# Patient Record
Sex: MALE | Race: AMERICAN INDIAN OR ALASKA NATIVE | NOT HISPANIC OR LATINO | Employment: UNEMPLOYED | URBAN - METROPOLITAN AREA
[De-identification: names, ages, dates, MRNs, and addresses within clinical notes are randomized per-mention and may not be internally consistent; named-entity substitution may affect disease eponyms.]

---

## 2017-09-02 ENCOUNTER — OFFICE VISIT (OUTPATIENT)
Dept: URGENT CARE | Facility: CLINIC | Age: 37
End: 2017-09-02
Payer: COMMERCIAL

## 2017-09-02 VITALS
DIASTOLIC BLOOD PRESSURE: 92 MMHG | HEIGHT: 71 IN | SYSTOLIC BLOOD PRESSURE: 132 MMHG | OXYGEN SATURATION: 97 % | WEIGHT: 205 LBS | RESPIRATION RATE: 16 BRPM | BODY MASS INDEX: 28.7 KG/M2 | HEART RATE: 98 BPM | TEMPERATURE: 98.6 F

## 2017-09-02 DIAGNOSIS — S39.012A LUMBOSACRAL STRAIN, INITIAL ENCOUNTER: Primary | ICD-10-CM

## 2017-09-02 PROCEDURE — 99204 OFFICE O/P NEW MOD 45 MIN: CPT | Performed by: PHYSICIAN ASSISTANT

## 2017-09-02 RX ORDER — HYDROCODONE BITARTRATE AND ACETAMINOPHEN 5; 325 MG/1; MG/1
1-2 TABLET ORAL EVERY 4 HOURS PRN
Qty: 20 TAB | Refills: 0 | Status: SHIPPED | OUTPATIENT
Start: 2017-09-02 | End: 2017-09-05

## 2017-09-02 RX ORDER — CYCLOBENZAPRINE HCL 5 MG
5-10 TABLET ORAL 3 TIMES DAILY PRN
Qty: 30 TAB | Refills: 0 | Status: SHIPPED | OUTPATIENT
Start: 2017-09-02

## 2017-09-02 RX ORDER — CYCLOBENZAPRINE HCL 5 MG
5-10 TABLET ORAL 3 TIMES DAILY PRN
Qty: 30 TAB | Refills: 0 | Status: SHIPPED | OUTPATIENT
Start: 2017-09-02 | End: 2017-09-02 | Stop reason: SDUPTHER

## 2017-09-02 RX ORDER — METHYLPREDNISOLONE 4 MG/1
TABLET ORAL
Qty: 21 TAB | Refills: 0 | Status: SHIPPED | OUTPATIENT
Start: 2017-09-02 | End: 2017-09-02 | Stop reason: SDUPTHER

## 2017-09-02 RX ORDER — METHYLPREDNISOLONE 4 MG/1
TABLET ORAL
Qty: 21 TAB | Refills: 0 | Status: SHIPPED | OUTPATIENT
Start: 2017-09-02 | End: 2019-01-24

## 2017-09-02 NOTE — PROGRESS NOTES
Subjective:      Pt is a 37 y.o. male who presents with Back Pain (while assisting mother from bed to bathroom )            HPI  Pt states he was caring for his injured mother moving her from the bathroom to her bed and injured his lower back while laying her on her bed x2 days. Pt has not taken any Rx medications for this condition. Pt states the pain is a 7-8/10, aching in nature and worse at night. Pt denies CP, SOB, NVD, paresthesias, headaches, dizziness, change in vision, hives, or other joint pain. Pt denies any bowel or bladder incontinence with this issue.  The pt's medication list, problem list, and allergies have been evaluated and reviewed during today's visit.    PMH:  Past Medical History:   Diagnosis Date   • Heart burn    • HIV (human immunodeficiency virus infection) (CMS-HCC)    • Hypercholesteremia        PSH:  Past Surgical History:   Procedure Laterality Date   • KNEE ARTHROSCOPY  10/1/2009    Performed by SHARON MURRELL at SURGERY [facility] ORS   • MEDIAL MENISCECTOMY  10/1/2009    Performed by SHARON MURRELL at SURGERY [facility] ORS   • DENTAL EXTRACTION(S)  2009    widsom teeth x 2       Fam Hx:  the patient's family history is not pertinent to their current complaint    Family Status   Relation Status   •     •     •         Soc HX:  Social History     Social History   • Marital status: Single     Spouse name: N/A   • Number of children: N/A   • Years of education: N/A     Occupational History   • Not on file.     Social History Main Topics   • Smoking status: Never Smoker   • Smokeless tobacco: Never Used   • Alcohol use No   • Drug use: No   • Sexual activity: Not Currently     Other Topics Concern   • Not on file     Social History Narrative   • No narrative on file         Medications:    Current Outpatient Prescriptions:   •  hydrocodone-acetaminophen (NORCO) 5-325 MG Tab per tablet, Take 1-2 Tabs by mouth every four hours as needed for up to 3 days., Disp: 20 Tab, Rfl: 0  •   "MethylPREDNISolone (MEDROL DOSEPAK) 4 MG Tablet Therapy Pack, Use as directed, Disp: 21 Tab, Rfl: 0  •  cyclobenzaprine (FLEXERIL) 5 MG tablet, Take 1-2 Tabs by mouth 3 times a day as needed., Disp: 30 Tab, Rfl: 0  •  efavirenz (SUSTIVA) 200 MG Cap, Take 600 mg by mouth every evening., Disp: , Rfl:   •  abacavir-lamivudine (EPZICOM) 600-300 MG per tablet, Take 1 Tab by mouth every day., Disp: , Rfl:       Allergies:  Review of patient's allergies indicates no known allergies.    ROS  Constitutional: Negative for fever, chills and malaise/fatigue.   HENT: Negative for congestion and sore throat.    Eyes: Negative for blurred vision, double vision and photophobia.   Respiratory: Negative for cough and shortness of breath.    Cardiovascular: Negative for chest pain and palpitations.   Gastrointestinal: Negative for heartburn, nausea, vomiting, abdominal pain, diarrhea and constipation.   Genitourinary: Negative for dysuria and flank pain.   Musculoskeletal: POS for lumbar joint pain and myalgias.   Skin: Negative for itching and rash.   Neurological: Negative for dizziness, tingling and headaches.   Endo/Heme/Allergies: Does not bruise/bleed easily.   Psychiatric/Behavioral: Negative for depression. The patient is not nervous/anxious.             Objective:     /92   Pulse 98   Temp 37 °C (98.6 °F)   Resp 16   Ht 1.803 m (5' 11\")   Wt 93 kg (205 lb)   SpO2 97%   BMI 28.59 kg/m²      Physical Exam      Constitutional: PT is oriented to person, place, and time. PT appears well-developed and well-nourished. No distress.   HENT:   Head: Normocephalic and atraumatic.   Mouth/Throat: Oropharynx is clear and moist. No oropharyngeal exudate.   Eyes: Conjunctivae normal and EOM are normal. Pupils are equal, round, and reactive to light.   Neck: Normal range of motion. Neck supple. No thyromegaly present.   Cardiovascular: Normal rate, regular rhythm, normal heart sounds and intact distal pulses.  Exam reveals no " gallop and no friction rub.    No murmur heard.  Pulmonary/Chest: Effort normal and breath sounds normal. No respiratory distress. PT has no wheezes. PT has no rales. Pt exhibits no tenderness.   Abdominal: Soft. Bowel sounds are normal. PT exhibits no distension and no mass. There is no tenderness. There is no rebound and no guarding.   Musculoskeletal: Upon inspection, no ecchymoses, no edema, no erythema. +TTP about paraspinal muscles, BLE: +AROM, +SILT, STR 5/5, DP 2+ B/L   Neurological: PT is alert and oriented to person, place, and time. PT has normal reflexes. No cranial nerve deficit.   Skin: Skin is warm and dry. No rash noted. PT is not diaphoretic. No erythema.       Psychiatric: PT has a normal mood and affect. PT behavior is normal. Judgment and thought content normal.          Assessment/Plan:     1. Lumbosacral strain, initial encounter    - hydrocodone-acetaminophen (NORCO) 5-325 MG Tab per tablet; Take 1-2 Tabs by mouth every four hours as needed for up to 3 days.  Dispense: 20 Tab; Refill: 0  - MethylPREDNISolone (MEDROL DOSEPAK) 4 MG Tablet Therapy Pack; Use as directed  Dispense: 21 Tab; Refill: 0  - cyclobenzaprine (FLEXERIL) 5 MG tablet; Take 1-2 Tabs by mouth 3 times a day as needed.  Dispense: 30 Tab; Refill: 0    Work note for light duty given to pt  [place]  Aware web site evaluation: I have obtained and reviewed patient utilization report from [facility] pharmacy database prior to writing prescription for controlled substance II, III or IV per [place] bill . Based on the report and my clinical assessment the prescription is medically necessary.   NSAIDs for pain 1-5, Norco for pain 6-10 or to help get to sleep.  Pt to take FMLA paperwork to PCP as it is not [facility] policy for UC to complete  RICE therapy discussed  Gentle ROM exercises discussed  WBAT BLE  Ice/heat therapy discussed  Rest, fluids encouraged.  AVS with medical info given.  Pt was in full understanding and agreement  with the plan.  Follow-up as needed if symptoms worsen or fail to improve.

## 2017-09-02 NOTE — PATIENT INSTRUCTIONS
Lumbosacral Strain  Lumbosacral strain is a strain of any of the parts that make up your lumbosacral vertebrae. Your lumbosacral vertebrae are the bones that make up the lower third of your backbone. Your lumbosacral vertebrae are held together by muscles and tough, fibrous tissue (ligaments).   CAUSES   A sudden blow to your back can cause lumbosacral strain. Also, anything that causes an excessive stretch of the muscles in the low back can cause this strain. This is typically seen when people exert themselves strenuously, fall, lift heavy objects, bend, or crouch repeatedly.  RISK FACTORS  · Physically demanding work.  · Participation in pushing or pulling sports or sports that require a sudden twist of the back (tennis, golf, baseball).  · Weight lifting.  · Excessive lower back curvature.  · Forward-tilted pelvis.  · Weak back or abdominal muscles or both.  · Tight hamstrings.  SIGNS AND SYMPTOMS   Lumbosacral strain may cause pain in the area of your injury or pain that moves (radiates) down your leg.   DIAGNOSIS  Your health care provider can often diagnose lumbosacral strain through a physical exam. In some cases, you may need tests such as X-ray exams.   TREATMENT   Treatment for your lower back injury depends on many factors that your clinician will have to evaluate. However, most treatment will include the use of anti-inflammatory medicines.  HOME CARE INSTRUCTIONS   · Avoid hard physical activities (tennis, racquetball, waterskiing) if you are not in proper physical condition for it. This may aggravate or create problems.  · If you have a back problem, avoid sports requiring sudden body movements. Swimming and walking are generally safer activities.  · Maintain good posture.  · Maintain a healthy weight.  · For acute conditions, you may put ice on the injured area.  ¨ Put ice in a plastic bag.  ¨ Place a towel between your skin and the bag.  ¨ Leave the ice on for 20 minutes, 2-3 times a day.  · When the  low back starts healing, stretching and strengthening exercises may be recommended.  SEEK MEDICAL CARE IF:  · Your back pain is getting worse.  · You experience severe back pain not relieved with medicines.  SEEK IMMEDIATE MEDICAL CARE IF:   · You have numbness, tingling, weakness, or problems with the use of your arms or legs.  · There is a change in bowel or bladder control.  · You have increasing pain in any area of the body, including your belly (abdomen).  · You notice shortness of breath, dizziness, or feel faint.  · You feel sick to your stomach (nauseous), are throwing up (vomiting), or become sweaty.  · You notice discoloration of your toes or legs, or your feet get very cold.  MAKE SURE YOU:   · Understand these instructions.  · Will watch your condition.  · Will get help right away if you are not doing well or get worse.     This information is not intended to replace advice given to you by your health care provider. Make sure you discuss any questions you have with your health care provider.     Document Released: 09/27/2006 Document Revised: 01/08/2016 Document Reviewed: 08/06/2014  [employer] Patient Education ©2016 [employer]

## 2017-09-02 NOTE — LETTER
September 2, 2017       Patient: Tres Sandoval   YOB: 1980   Date of Visit: 9/2/2017         To Whom It May Concern:    It is my medical opinion that Tres Sandoval may be excused from work for the dates of 9/3/17-9/7/17. 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 Pt's primary care to fill out FMLA paperwork as we do not at the urgent care level.       If you have any questions or concerns, please don't hesitate to call [phone]          Sincerely,          Aakash Michael P.A.-C.  Electronically Signed

## 2019-01-24 ENCOUNTER — HOSPITAL ENCOUNTER (EMERGENCY)
Facility: MEDICAL CENTER | Age: 39
End: 2019-01-24
Payer: COMMERCIAL

## 2019-01-24 VITALS
TEMPERATURE: 97.5 F | BODY MASS INDEX: 30.74 KG/M2 | HEIGHT: 71 IN | WEIGHT: 219.58 LBS | OXYGEN SATURATION: 94 % | RESPIRATION RATE: 16 BRPM | DIASTOLIC BLOOD PRESSURE: 92 MMHG | HEART RATE: 105 BPM | SYSTOLIC BLOOD PRESSURE: 138 MMHG

## 2019-01-24 PROCEDURE — 302449 STATCHG TRIAGE ONLY (STATISTIC)

## 2019-01-24 RX ORDER — IBUPROFEN 200 MG
800 TABLET ORAL EVERY 6 HOURS PRN
COMMUNITY

## 2019-01-24 NOTE — ED TRIAGE NOTES
Pt ambulated to triage with   Chief Complaint   Patient presents with   • Flu Like Symptoms     fever chills cough for the last 3 wks,    • Side Pain     right side, pt reportrs that he thought it was a broken rib, reports sharp shooting pain that started last night;    • Diarrhea     no blood in stool, last episode yesterday.     Pt has h/o HIV.  Pt Informed regarding triage process and verbalized understanding to inform triage tech or RN for any changes in condition. Placed in lobby.

## 2019-01-25 NOTE — ED NOTES
"Pt mother walked up and asked several times \"When is my son going to get a room we have been here for 3 hrs\". Pt mother was advised that PT has been here for 1 hr and 38 mins and there is 3 ppl in front of him at this time. Pt stated  Again \" when is he going to get a room because I  Have seen several ppl here that came after us that have room and we don't \". Pt was advised of triage process several times. Notified charge nurse of Pt  Mother  Compliant.  "

## 2019-01-25 NOTE — ED NOTES
"While trying to draw labs patients mother was upset, \"this is taking too damn long.\" apologized to patient and his mother, explained drawing labs would expedite his care.  Patient stood up and stated he was leaving and going to [facility].  Left before further care rendered.    "

## 2022-12-23 ENCOUNTER — HOSPITAL ENCOUNTER (EMERGENCY)
Facility: MEDICAL CENTER | Age: 42
End: 2022-12-24
Attending: EMERGENCY MEDICINE
Payer: COMMERCIAL

## 2022-12-23 VITALS
TEMPERATURE: 98.2 F | WEIGHT: 227.51 LBS | BODY MASS INDEX: 31.85 KG/M2 | OXYGEN SATURATION: 96 % | DIASTOLIC BLOOD PRESSURE: 84 MMHG | HEIGHT: 71 IN | HEART RATE: 101 BPM | SYSTOLIC BLOOD PRESSURE: 135 MMHG | RESPIRATION RATE: 17 BRPM

## 2022-12-23 DIAGNOSIS — K04.7 DENTAL INFECTION: ICD-10-CM

## 2022-12-23 DIAGNOSIS — K04.7 PERIAPICAL ABSCESS: ICD-10-CM

## 2022-12-23 PROCEDURE — 99283 EMERGENCY DEPT VISIT LOW MDM: CPT

## 2022-12-23 PROCEDURE — 41800 DRAINAGE OF GUM LESION: CPT

## 2022-12-23 PROCEDURE — 42999 UNLISTED PX PHRNX ADND/TNSL: CPT

## 2022-12-23 RX ORDER — AMOXICILLIN 500 MG/1
500 CAPSULE ORAL 3 TIMES DAILY
Qty: 21 CAPSULE | Refills: 0 | Status: SHIPPED | OUTPATIENT
Start: 2022-12-23 | End: 2022-12-30

## 2022-12-23 RX ORDER — AMOXICILLIN 500 MG/1
500 CAPSULE ORAL ONCE
Status: COMPLETED | OUTPATIENT
Start: 2022-12-24 | End: 2022-12-24

## 2022-12-24 PROCEDURE — 700102 HCHG RX REV CODE 250 W/ 637 OVERRIDE(OP): Performed by: EMERGENCY MEDICINE

## 2022-12-24 PROCEDURE — A9270 NON-COVERED ITEM OR SERVICE: HCPCS | Performed by: EMERGENCY MEDICINE

## 2022-12-24 PROCEDURE — 41800 DRAINAGE OF GUM LESION: CPT

## 2022-12-24 RX ADMIN — AMOXICILLIN 500 MG: 500 CAPSULE ORAL at 00:11

## 2022-12-24 NOTE — ED PROVIDER NOTES
ED Provider Note    Scribed for Tarun Dawson M.D. by Alec Chiang. 12/23/2022,  11:18 PM.    Means of Arrival: Walk-In  History obtained from: Patient   History limited by: None     CHIEF COMPLAINT  Chief Complaint   Patient presents with    Dental Pain     Patient reports had a filling fall out last week. Patient reports right upper tooth now has pain and swelling x 2 days.     HPI  Tres Sandoval is a 42 y.o. male who presents to the Emergency Department for upper right sided dental pain onset 2 days ago. Patient reports associated right sided facial swelling, headache. Patient has attempted to alleviate his symptoms with OTC pain medication. There are no exacerbating factors. He denies associated fever.     REVIEW OF SYSTEMS  CONSTITUTIONAL:  Negative for fever.  HEENT: Positive for right upper sided dental pain, right sided facial swelling.   NEUROLOGIC:  Positive for headache.   See HPI for further details.     PAST MEDICAL HISTORY  Past Medical History:   Diagnosis Date    Heart burn     HIV (human immunodeficiency virus infection) (HCC)     Hypercholesteremia        FAMILY HISTORY  Family History   Problem Relation Age of Onset    Cancer Unknown     Diabetes Unknown     Stroke Unknown        SOCIAL HISTORY   reports that he has never smoked. He has never used smokeless tobacco. He reports that he does not drink alcohol and does not use drugs.    SURGICAL HISTORY  Past Surgical History:   Procedure Laterality Date    KNEE ARTHROSCOPY  10/1/2009    Performed by SHARON MURRELL at SURGERY [facility]S    MENISCECTOMY, KNEE, MEDIAL  10/1/2009    Performed by SHARON UMRRELL at SURGERY [facility] ORS    DENTAL EXTRACTION(S)  2009    widsom teeth x 2       CURRENT MEDICATIONS  Home Medications       Reviewed by Vicky Ramachandran R.N. (Registered Nurse) on 12/23/22 at 2255  Med List Status: Partial     Medication Last Dose Status   abacavir-lamivudine (EPZICOM) 600-300 MG per tablet  Active  "  cyclobenzaprine (FLEXERIL) 5 MG tablet  Active   efavirenz (SUSTIVA) 200 MG Cap  Active   ibuprofen (MOTRIN) 200 MG Tab  Active                    ALLERGIES  No Known Allergies    PHYSICAL EXAM  VITAL SIGNS: BP (!) 146/97   Pulse (!) 116   Temp 37.7 °C (99.8 °F) (Temporal)   Resp 17   Ht 1.803 m (5' 11\")   Wt 103 kg (227 lb 8.2 oz)   SpO2 96%   BMI 31.73 kg/m²    Gen: Alert, mild distress  HEENT: Normocephalic. Swelling to right cheek soft sublingual space, tooth 4 has a central cavity and there is surrounding edema in the area.    Eyes: PERRL, EOMI, normal conjunctiva  Neck: trachea midline  Resp: no respiratory distress  CV: No JVD  Abd: non-distended  Ext: No deformities  Psych: normal mood  Neuro: speech fluent     DIAGNOSTIC STUDIES / PROCEDURES     PROCEDURES:    Dental Block:  Indication: dental pain  Consent: verbal from the patient  Location: tooth # 4  Anesthesia: 3 mL of Bupivacaine with epinephrine.   Toleration: the patient tolerated well, no immediate complications or bleeding    Incision and Drainage Procedure Note    Indication: Oral abscess    Procedure: Local anesthesia was a prior dental block.  Aspiration with a needle was then performed over the greatest area of fluctuance and 0.5 cc of pus with blood material was expressed.     The patient tolerated the procedure well.    Complications: None      COURSE & MEDICAL DECISION MAKING  Pertinent Labs & Imaging studies reviewed. (See chart for details)    11:18 PM - Patient seen and examined at bedside. Dental block performed as detailed above. Patient treated with Bupivacaine with epinephrine 3 mL. Discussed plan of care with patient. Patient is understanding and agreeable with plan.      11:50 PM - Patient to be treated with Amoxil 500 mg.     11:52 PM - Patient was reevaluated at bedside. Incision and drainage performed as detailed above. Discussed findings and plan with patient. Advised patient to follow up with his primary care physician " and his dentist. I then informed the patient of my plan for discharge, which includes strict return precautions for any new or worsening symptoms. Patient understands and verbalizes agreement to plan of care. They were given an opportunity to ask questions. Patient is comfortable going home at this time.      Medical Decision Making:  Patient presents with facial swelling concerning for infection of odontogenic origin.  He does have fluctuance concerning for abscess.  This was drained in the emergency department.  Patient tolerated the procedure well without any complications.  Will be started on antibiotics for this.  No evidence of airway compromise, Lemierre syndrome, Bebo's angina.    The patient will return for new or worsening symptoms and is stable at the time of discharge.    The patient is referred to a primary physician for blood pressure management, diabetic screening, and for all other preventative health concerns.    DISPOSITION:  Patient will be discharged home in stable condition.    FOLLOW UP:  Destini Amador M.D.  [address] [facility] [address]  [phone]    Schedule an appointment as soon as possible for a visit       Please follow-up with a dentist.            OUTPATIENT MEDICATIONS:  New Prescriptions    AMOXICILLIN (AMOXIL) 500 MG CAP    Take 1 Capsule by mouth 3 times a day for 7 days.        FINAL IMPRESSION  1. Dental infection    2. Periapical abscess    3. Incision and Drainage  4. Dental Block        Alec SPIVEY (Afshin), am scribing for, and in the presence of, Tarun Dawson M.D..    Electronically signed by: Alec Chiang (Afshin), 12/23/2022    Tarun SPIVEY M.D. personally performed the services described in this documentation, as scribed by Alec Chiang in my presence, and it is both accurate and complete.    The note accurately reflects work and decisions made by me.  Tarun Dawson M.D.  12/24/2022  12:20 AM      This dictation was created using voice recognition  software. The accuracy of the dictation is limited to the abilities of the software. I expect there may be some errors of grammar and possibly content. The nursing notes were reviewed and certain aspects of this information were incorporated into this note.

## 2022-12-24 NOTE — ED TRIAGE NOTES
Chief Complaint   Patient presents with    Dental Pain     Patient reports had a filling fall out last week. Patient reports right upper tooth now has pain and swelling x 2 days.

## 2022-12-24 NOTE — DISCHARGE INSTRUCTIONS
You were seen emerged part for facial swelling and dental pain.  You are found to have a dental abscess which was drained in the emergency department.  You are being started on antibiotics.  Please take these as prescribed.    Please follow-up with a dentist.    For pain you can take acetaminophen (Tylenol), 1000mg every 8 hours as needed for pain. Do not take more than 3000mg of acetaminophen in any 24 hour period. You can also take  ibuprofen (Motrin), 600mg every 6 hours as needed for pain (take with food to avoid GI upset).  Taking these medications regularly during the day can be very effective in controlling pain.    Please return to the emergency department or seek medical attention if you develop:  Difficulty breathing, fevers, worsening swelling, any other new or concerning findings

## 2024-08-10 PROCEDURE — 36415 COLL VENOUS BLD VENIPUNCTURE: CPT

## 2024-08-10 PROCEDURE — 99283 EMERGENCY DEPT VISIT LOW MDM: CPT

## 2024-08-11 ENCOUNTER — APPOINTMENT (OUTPATIENT)
Dept: RADIOLOGY | Facility: MEDICAL CENTER | Age: 44
End: 2024-08-11
Attending: STUDENT IN AN ORGANIZED HEALTH CARE EDUCATION/TRAINING PROGRAM
Payer: COMMERCIAL

## 2024-08-11 ENCOUNTER — HOSPITAL ENCOUNTER (EMERGENCY)
Facility: MEDICAL CENTER | Age: 44
End: 2024-08-11
Attending: STUDENT IN AN ORGANIZED HEALTH CARE EDUCATION/TRAINING PROGRAM
Payer: COMMERCIAL

## 2024-08-11 VITALS
WEIGHT: 269.18 LBS | RESPIRATION RATE: 19 BRPM | TEMPERATURE: 97.7 F | BODY MASS INDEX: 37.69 KG/M2 | HEART RATE: 76 BPM | DIASTOLIC BLOOD PRESSURE: 85 MMHG | HEIGHT: 71 IN | SYSTOLIC BLOOD PRESSURE: 151 MMHG | OXYGEN SATURATION: 94 %

## 2024-08-11 DIAGNOSIS — M79.89 LEG SWELLING: ICD-10-CM

## 2024-08-11 LAB
ALBUMIN SERPL BCP-MCNC: 3.9 G/DL (ref 3.2–4.9)
ALBUMIN/GLOB SERPL: 1.4 G/DL
ALP SERPL-CCNC: 75 U/L (ref 30–99)
ALT SERPL-CCNC: 21 U/L (ref 2–50)
ANION GAP SERPL CALC-SCNC: 11 MMOL/L (ref 7–16)
AST SERPL-CCNC: 22 U/L (ref 12–45)
BASOPHILS # BLD AUTO: 0.5 % (ref 0–1.8)
BASOPHILS # BLD: 0.04 K/UL (ref 0–0.12)
BILIRUB SERPL-MCNC: 0.2 MG/DL (ref 0.1–1.5)
BUN SERPL-MCNC: 17 MG/DL (ref 8–22)
CALCIUM ALBUM COR SERPL-MCNC: 9.2 MG/DL (ref 8.5–10.5)
CALCIUM SERPL-MCNC: 9.1 MG/DL (ref 8.5–10.5)
CHLORIDE SERPL-SCNC: 109 MMOL/L (ref 96–112)
CO2 SERPL-SCNC: 22 MMOL/L (ref 20–33)
CREAT SERPL-MCNC: 0.96 MG/DL (ref 0.5–1.4)
EOSINOPHIL # BLD AUTO: 0.33 K/UL (ref 0–0.51)
EOSINOPHIL NFR BLD: 4.3 % (ref 0–6.9)
ERYTHROCYTE [DISTWIDTH] IN BLOOD BY AUTOMATED COUNT: 45.9 FL (ref 35.9–50)
GFR SERPLBLD CREATININE-BSD FMLA CKD-EPI: 100 ML/MIN/1.73 M 2
GLOBULIN SER CALC-MCNC: 2.8 G/DL (ref 1.9–3.5)
GLUCOSE SERPL-MCNC: 94 MG/DL (ref 65–99)
HCT VFR BLD AUTO: 39.8 % (ref 42–52)
HGB BLD-MCNC: 14 G/DL (ref 14–18)
IMM GRANULOCYTES # BLD AUTO: 0.04 K/UL (ref 0–0.11)
IMM GRANULOCYTES NFR BLD AUTO: 0.5 % (ref 0–0.9)
LYMPHOCYTES # BLD AUTO: 2.52 K/UL (ref 1–4.8)
LYMPHOCYTES NFR BLD: 32.9 % (ref 22–41)
MCH RBC QN AUTO: 32.9 PG (ref 27–33)
MCHC RBC AUTO-ENTMCNC: 35.2 G/DL (ref 32.3–36.5)
MCV RBC AUTO: 93.6 FL (ref 81.4–97.8)
MONOCYTES # BLD AUTO: 0.76 K/UL (ref 0–0.85)
MONOCYTES NFR BLD AUTO: 9.9 % (ref 0–13.4)
NEUTROPHILS # BLD AUTO: 3.98 K/UL (ref 1.82–7.42)
NEUTROPHILS NFR BLD: 51.9 % (ref 44–72)
NRBC # BLD AUTO: 0 K/UL
NRBC BLD-RTO: 0 /100 WBC (ref 0–0.2)
NT-PROBNP SERPL IA-MCNC: 243 PG/ML (ref 0–125)
PLATELET # BLD AUTO: 278 K/UL (ref 164–446)
PMV BLD AUTO: 8.6 FL (ref 9–12.9)
POTASSIUM SERPL-SCNC: 3.9 MMOL/L (ref 3.6–5.5)
PROT SERPL-MCNC: 6.7 G/DL (ref 6–8.2)
RBC # BLD AUTO: 4.25 M/UL (ref 4.7–6.1)
SODIUM SERPL-SCNC: 142 MMOL/L (ref 135–145)
WBC # BLD AUTO: 7.7 K/UL (ref 4.8–10.8)

## 2024-08-11 PROCEDURE — 85025 COMPLETE CBC W/AUTO DIFF WBC: CPT

## 2024-08-11 PROCEDURE — 83880 ASSAY OF NATRIURETIC PEPTIDE: CPT

## 2024-08-11 PROCEDURE — 80053 COMPREHEN METABOLIC PANEL: CPT

## 2024-08-11 PROCEDURE — 93970 EXTREMITY STUDY: CPT

## 2024-08-11 RX ORDER — ATORVASTATIN CALCIUM 40 MG/1
TABLET, FILM COATED ORAL
COMMUNITY

## 2024-08-11 NOTE — ED NOTES
Pt has pitting edema to bilat feet as well as non-pitting to lower legs. Pt also sunburnt over whole body. Denies cardiac hx. Denies SOB. Spo2 98% on RA.

## 2024-08-11 NOTE — DISCHARGE INSTRUCTIONS
You were seen for leg swelling. Thankfully your ultrasounds show no signs of blood clots in the veins of your legs. Please elevate your legs and use compression stockings. Return for any new or worsening symptoms.

## 2024-08-11 NOTE — ED PROVIDER NOTES
ED Provider Note    CHIEF COMPLAINT  Chief Complaint   Patient presents with    Leg Swelling     The pt reports taking a bus from [place] and noted that he has bilateral leg swelling since arriving to [facility] yesterday. Hx of leg swelling but reports that it usually dissipates. Redness noted to legs but pt states that it is a sunburn       EXTERNAL RECORDS REVIEWED  Outpatient Notes patient follows with ID for HIV infection and is on HAART    HPI/ROS  LIMITATION TO HISTORY   Select: : None  OUTSIDE HISTORIAN(S):  None    Tres Sandoval is a 44 y.o. male who presents for evaluation of bilateral leg swelling that is worse in the ankles and feet.  He notes that he was on a 20 hour bus ride from [place] and just returned to [place] yesterday evening.  He noticed his symptoms at that point.  He has had this before but not quite this severe and symptoms normally go away.  He has not been using compression stockings or elevating his leg.  He has no calf pain.  He denies any redness aside from his sunburn.  He has no fevers, difficulty breathing, chest pain.  He has no history of heart problems.  He does have history of HIV but has been undetectable for the past several years.    PAST MEDICAL HISTORY   has a past medical history of Heart burn, HIV (human immunodeficiency virus infection) (HCC), and Hypercholesteremia.    SURGICAL HISTORY   has a past surgical history that includes dental extraction(s) (2009); knee arthroscopy (10/1/2009); and meniscectomy, knee, medial (10/1/2009).    FAMILY HISTORY  Family History   Problem Relation Age of Onset    Cancer Unknown     Diabetes Unknown     Stroke Unknown        SOCIAL HISTORY  Social History     Tobacco Use    Smoking status: Never    Smokeless tobacco: Never   Vaping Use    Vaping status: Never Used   Substance and Sexual Activity    Alcohol use: No    Drug use: No    Sexual activity: Not Currently       CURRENT MEDICATIONS  Home Medications       Reviewed by Alek Babb  "GINI (Registered Nurse) on 08/11/24 at 0001  Med List Status: Partial     Medication Last Dose Status   abacavir-lamivudine (EPZICOM) 600-300 MG per tablet  Active   cyclobenzaprine (FLEXERIL) 5 MG tablet  Active   efavirenz (SUSTIVA) 200 MG Cap  Active   ibuprofen (MOTRIN) 200 MG Tab  Active                    ALLERGIES  No Known Allergies    PHYSICAL EXAM  VITAL SIGNS: BP (!) 140/93   Pulse 98   Temp 36.7 °C (98.1 °F) (Temporal)   Resp 18   Ht 1.803 m (5' 11\")   Wt 122 kg (269 lb 2.9 oz)   SpO2 98%   BMI 37.54 kg/m²      Constitutional: Well developed, Well nourished, No acute respiratory distress, Non-toxic appearance.   HENT: Normocephalic, Atraumatic  Cardiovascular: Normal heart rate, Normal rhythm, No murmurs, No rubs, No gallops.   Thorax & Lungs: Clear to auscultation bilaterally, No respiratory distress, No wheezing.  Abdomen: Soft nontender normal bowel sounds no rebound masses or peritoneal signs  Skin: Warm, Dry, No erythema, No rash.   Extremities: Intact distal pulses, 1+ pitting edema noted to bilateral ankles, no calf tenderness bilaterally, symmetric  Musculoskeletal: Good range of motion in all major joints. Normal gait.  Neurologic: Alert & oriented. No focal deficits noted.   Psych: Alert normal affect       EKG/LABS  Results for orders placed or performed during the hospital encounter of 08/11/24   CBC WITH DIFFERENTIAL   Result Value Ref Range    WBC 7.7 4.8 - 10.8 K/uL    RBC 4.25 (L) 4.70 - 6.10 M/uL    Hemoglobin 14.0 14.0 - 18.0 g/dL    Hematocrit 39.8 (L) 42.0 - 52.0 %    MCV 93.6 81.4 - 97.8 fL    MCH 32.9 27.0 - 33.0 pg    MCHC 35.2 32.3 - 36.5 g/dL    RDW 45.9 35.9 - 50.0 fL    Platelet Count 278 164 - 446 K/uL    MPV 8.6 (L) 9.0 - 12.9 fL    Neutrophils-Polys 51.90 44.00 - 72.00 %    Lymphocytes 32.90 22.00 - 41.00 %    Monocytes 9.90 0.00 - 13.40 %    Eosinophils 4.30 0.00 - 6.90 %    Basophils 0.50 0.00 - 1.80 %    Immature Granulocytes 0.50 0.00 - 0.90 %    Nucleated RBC " 0.00 0.00 - 0.20 /100 WBC    Neutrophils (Absolute) 3.98 1.82 - 7.42 K/uL    Lymphs (Absolute) 2.52 1.00 - 4.80 K/uL    Monos (Absolute) 0.76 0.00 - 0.85 K/uL    Eos (Absolute) 0.33 0.00 - 0.51 K/uL    Baso (Absolute) 0.04 0.00 - 0.12 K/uL    Immature Granulocytes (abs) 0.04 0.00 - 0.11 K/uL    NRBC (Absolute) 0.00 K/uL   COMP METABOLIC PANEL   Result Value Ref Range    Sodium 142 135 - 145 mmol/L    Potassium 3.9 3.6 - 5.5 mmol/L    Chloride 109 96 - 112 mmol/L    Co2 22 20 - 33 mmol/L    Anion Gap 11.0 7.0 - 16.0    Glucose 94 65 - 99 mg/dL    Bun 17 8 - 22 mg/dL    Creatinine 0.96 0.50 - 1.40 mg/dL    Calcium 9.1 8.5 - 10.5 mg/dL    Correct Calcium 9.2 8.5 - 10.5 mg/dL    AST(SGOT) 22 12 - 45 U/L    ALT(SGPT) 21 2 - 50 U/L    Alkaline Phosphatase 75 30 - 99 U/L    Total Bilirubin 0.2 0.1 - 1.5 mg/dL    Albumin 3.9 3.2 - 4.9 g/dL    Total Protein 6.7 6.0 - 8.2 g/dL    Globulin 2.8 1.9 - 3.5 g/dL    A-G Ratio 1.4 g/dL   proBrain Natriuretic Peptide, NT   Result Value Ref Range    NT-proBNP 243 (H) 0 - 125 pg/mL   ESTIMATED GFR   Result Value Ref Range    GFR (CKD-EPI) 100 >60 mL/min/1.73 m 2       I have independently interpreted this EKG    RADIOLOGY/PROCEDURES   I have independently interpreted the diagnostic imaging associated with this visit and am waiting the final reading from the radiologist.   My preliminary interpretation is as follows: vessels are compressible    Radiologist interpretation:  US-EXTREMITY VENOUS LOWER BILAT   Final Result       FINDINGS:   Bilateral lower extremities.    All veins demonstrate complete color filling and compressibility with    normal venous flow dynamics including spontaneous flow and respiratory    phasicity.    No deep venous thrombosis.    COURSE & MEDICAL DECISION MAKING    ASSESSMENT, COURSE AND PLAN  Care Narrative:   This is a 44-year-old male who presents for evaluation of bilateral leg swelling which he noticed yesterday after he got off a 20-hour [employer] bus  ride.  On arrival, his vital signs are stable.  He is nontoxic in appearance.  Differential diagnoses include but is not limited to DVT, venous insufficiency, new onset heart failure.  No evidence of cellulitis on exam.  Bilateral lower extremities are neurovascular intact.  No painful range of motion in his joints, doubt septic arthritis.  DVT ultrasound obtained bilaterally although I do think DVT is less likely given symptoms are symmetric however he did have 20-hour bus ride therefore that his risk factor for DVT.  Ultrasound negative for DVT.  There is no leukocytosis or ANDERS.  Mild elevation in NT proBNP but do not think that patient has new onset heart failure given elevation in this lab is only extremely mild.  I do think that this is likely related to venous insufficiency in the setting of very long bus ride.  Patient is counseled to elevate legs and use compression stockings.  He is advised to especially use compression stockings for any long travel.  He will follow-up with his primary care doctor.  Strict return precautions were discussed.  Patient is agreeable to discharge home no further questions.              ADDITIONAL PROBLEMS MANAGED  None    DISPOSITION AND DISCUSSIONS  I have discussed management of the patient with the following physicians and CUBA's:  None    Discussion of management with other QHP or appropriate source(s): None     Escalation of care considered, and ultimately not performed:acute inpatient care management, however at this time, the patient is most appropriate for outpatient management    Barriers to care at this time, including but not limited to:  None .     Decision tools and prescription drugs considered including, but not limited to:  None .     The patient will return for new or worsening symptoms and is stable at the time of discharge.    The patient is referred to a primary physician for blood pressure management, diabetic screening, and for all other preventative health  concerns.      DISPOSITION:  Patient will be discharged home in stable condition.    FOLLOW UP:  Your primary care doctor          [facility], Emergency Dept  [address] [address] [phone]          OUTPATIENT MEDICATIONS:  Discharge Medication List as of 8/11/2024  3:10 AM            FINAL DIAGNOSIS  1. Leg swelling         Electronically signed by: Elizabeth Howard M.D., 8/11/2024 12:47 AM

## 2024-08-11 NOTE — ED TRIAGE NOTES
"Chief Complaint   Patient presents with    Leg Swelling     The pt reports taking a bus from [place] and noted that he has bilateral leg swelling since arriving to [facility] yesterday. Hx of leg swelling but reports that it usually dissipates. Redness noted to legs but pt states that it is a sunburn     BP (!) 140/93   Pulse 98   Temp 36.7 °C (98.1 °F) (Temporal)   Resp 18   Ht 1.803 m (5' 11\")   Wt 122 kg (269 lb 2.9 oz)   SpO2 98%   BMI 37.54 kg/m²     Pt ambulatory to triage. Pt A&Ox4.  Pt placed back in lobby, educated on triage process, and told to inform staff of any change in condition.     "

## 2024-10-19 ENCOUNTER — PHARMACY VISIT (OUTPATIENT)
Dept: PHARMACY | Facility: MEDICAL CENTER | Age: 44
End: 2024-10-19
Payer: MEDICARE

## 2024-10-19 ENCOUNTER — HOSPITAL ENCOUNTER (EMERGENCY)
Facility: MEDICAL CENTER | Age: 44
End: 2024-10-19
Attending: EMERGENCY MEDICINE
Payer: COMMERCIAL

## 2024-10-19 VITALS
BODY MASS INDEX: 35.85 KG/M2 | OXYGEN SATURATION: 96 % | TEMPERATURE: 97.1 F | SYSTOLIC BLOOD PRESSURE: 145 MMHG | HEART RATE: 87 BPM | DIASTOLIC BLOOD PRESSURE: 88 MMHG | WEIGHT: 257.06 LBS | RESPIRATION RATE: 16 BRPM

## 2024-10-19 DIAGNOSIS — K04.7 DENTAL INFECTION: ICD-10-CM

## 2024-10-19 PROCEDURE — RXMED WILLOW AMBULATORY MEDICATION CHARGE: Performed by: EMERGENCY MEDICINE

## 2024-10-19 PROCEDURE — 99282 EMERGENCY DEPT VISIT SF MDM: CPT

## 2024-10-19 RX ORDER — OXYCODONE AND ACETAMINOPHEN 5; 325 MG/1; MG/1
1 TABLET ORAL EVERY 4 HOURS PRN
Qty: 15 TABLET | Refills: 0 | Status: SHIPPED | OUTPATIENT
Start: 2024-10-19 | End: 2024-10-22

## 2024-10-19 ASSESSMENT — FIBROSIS 4 INDEX: FIB4 SCORE: 0.74
